# Patient Record
Sex: MALE | URBAN - METROPOLITAN AREA
[De-identification: names, ages, dates, MRNs, and addresses within clinical notes are randomized per-mention and may not be internally consistent; named-entity substitution may affect disease eponyms.]

---

## 2018-03-13 LAB
CREATININE, EXTERNAL: 0.78
LDL-C, EXTERNAL: 132

## 2018-03-17 LAB — HBA1C MFR BLD HPLC: 5.2 %

## 2018-07-23 ENCOUNTER — OFFICE VISIT (OUTPATIENT)
Dept: ENDOCRINOLOGY | Age: 43
End: 2018-07-23

## 2018-07-23 VITALS
RESPIRATION RATE: 18 BRPM | HEART RATE: 80 BPM | SYSTOLIC BLOOD PRESSURE: 137 MMHG | OXYGEN SATURATION: 96 % | DIASTOLIC BLOOD PRESSURE: 86 MMHG | BODY MASS INDEX: 39.99 KG/M2 | HEIGHT: 65 IN | WEIGHT: 240 LBS

## 2018-07-23 DIAGNOSIS — Z99.89 OSA ON CPAP: ICD-10-CM

## 2018-07-23 DIAGNOSIS — G47.33 OSA ON CPAP: ICD-10-CM

## 2018-07-23 DIAGNOSIS — Z86.39 HISTORY OF GOITER: ICD-10-CM

## 2018-07-23 DIAGNOSIS — R79.89 LOW TESTOSTERONE: Primary | ICD-10-CM

## 2018-07-23 DIAGNOSIS — I10 HTN (HYPERTENSION), BENIGN: ICD-10-CM

## 2018-07-23 DIAGNOSIS — R53.83 FATIGUE, UNSPECIFIED TYPE: ICD-10-CM

## 2018-07-23 RX ORDER — VERAPAMIL HYDROCHLORIDE 240 MG/1
TABLET, FILM COATED, EXTENDED RELEASE ORAL
COMMUNITY
Start: 2018-05-11

## 2018-07-23 RX ORDER — LORAZEPAM 1 MG/1
TABLET ORAL
COMMUNITY

## 2018-07-23 RX ORDER — GUAIFENESIN 100 MG/5ML
81 LIQUID (ML) ORAL DAILY
COMMUNITY

## 2018-07-23 RX ORDER — LOPERAMIDE HCL 2 MG
2 TABLET ORAL
COMMUNITY

## 2018-07-23 RX ORDER — BUTALBITAL, ACETAMINOPHEN AND CAFFEINE 50; 325; 40 MG/1; MG/1; MG/1
1 TABLET ORAL
COMMUNITY

## 2018-07-23 RX ORDER — CALCIUM POLYCARBOPHIL 625 MG
625 TABLET ORAL 2 TIMES DAILY
COMMUNITY

## 2018-07-23 RX ORDER — LANOLIN ALCOHOL/MO/W.PET/CERES
1000 CREAM (GRAM) TOPICAL DAILY
COMMUNITY
End: 2018-11-20

## 2018-07-23 RX ORDER — CHOLECALCIFEROL TAB 125 MCG (5000 UNIT) 125 MCG
TAB ORAL 2 TIMES DAILY
COMMUNITY

## 2018-07-23 NOTE — PROGRESS NOTES
1. Have you been to the ER, urgent care clinic since your last visit? Hospitalized since your last visit? No    2. Have you seen or consulted any other health care providers outside of the [address] [place] [address] since your last visit? Include any pap smears or colon screening. No     Chief Complaint   Patient presents with    New Patient     Patient moved from [place]- had labs and test done there.  Thyroid Problem       Patient states he has had low testosterone in the past and tried the injections, states it did not help.      Visit Vitals    /86 (BP 1 Location: Left arm, BP Patient Position: Sitting)    Pulse 80    Resp 18    Ht 5' 5\" (1.651 m)    Wt 240 lb (108.9 kg)    SpO2 96%    BMI 39.94 kg/m2     Learning Assessment 7/23/2018   PRIMARY LEARNER Patient   PRIMARY LANGUAGE ENGLISH   LEARNER PREFERENCE PRIMARY DEMONSTRATION   ANSWERED BY patient   RELATIONSHIP SELF

## 2018-07-23 NOTE — PATIENT INSTRUCTIONS
Low testosterone: Will reassess. I suspect low testosterone is due to the weight gain you've had over the last 10-15 years  I think testosterone would normalize with weight loss - 25 lbs or more. IF testosterone is low  1. We can consider reassessing after efforts to lose weight  2. Consider treating low testosterone with injections - subcutaneous and weekly , or with daily gel application    3. Clomiphene - 50 mg every other day. Tablet. Non-FDA approved, but appears effective and safe in smaller studies    Weight:  Resume tracking calories  Continue exercising, but main focus should be on calorie intake  Recommend eating high fiber foods that are minimally processed. Also work on eating lean proteins. Could consider medications in future if dietary efforts are not successful. Belviq may be particularly helpful for emotional eaters  Contrave may be particularly helpful for binge eaters  Saxenda - daily injection (very small needle) is one of the more effective, but expensive, treatment options. This would need to be covered by your insurance  Phentermine + topiramate - taken individually and as generic medications this is very affordable. Anxiety could be side effect though. Branded product is Qsymia.

## 2018-07-23 NOTE — MR AVS SNAPSHOT
[address] 
[phone] Patient: Veronica Bynum MRN: [id number] :1975 Visit Information Date & Time Provider Department Dept. Phone Encounter #  
 2018 10:50 AM Magnus Song, [address] [facility] Diabetes and Endocrinology 21  Follow-up Instructions Return in about 4 months (around 2018). Upcoming Health Maintenance Date Due DTaP/Tdap/Td series (1 - Tdap) 1997 Influenza Age 5 to Adult 2018 Allergies as of 2018  Review Complete On: 2018 By: Pat Party No Known Allergies Current Immunizations  Never Reviewed No immunizations on file. Not reviewed this visit You Were Diagnosed With   
  
 Codes Comments Low testosterone    -  Primary ICD-10-CM: R79.89 ICD-9-CM: 790.99 BMI 39.0-39.9,adult     ICD-10-CM: U13.01 ICD-9-CM: V85.39   
 MARY JANE on CPAP     ICD-10-CM: G47.33, Z99.89 ICD-9-CM: 327.23, V46.8 HTN (hypertension), benign     ICD-10-CM: I10 
ICD-9-CM: 401.1 History of goiter     ICD-10-CM: Z86.39 
ICD-9-CM: V12.29 Fatigue, unspecified type     ICD-10-CM: R53.83 ICD-9-CM: 780.79 Vitals BP Pulse Resp Height(growth percentile) Weight(growth percentile) SpO2  
 137/86 (BP 1 Location: Left arm, BP Patient Position: Sitting) 80 18 5' 5\" (1.651 m) 240 lb (108.9 kg) 96% BMI Smoking Status 39.94 kg/m2 Former Smoker Vitals History BMI and BSA Data Body Mass Index Body Surface Area  
 39.94 kg/m 2 2.23 m 2 Preferred Pharmacy Pharmacy Name Phone [facility], [address] Your Updated Medication List  
  
   
This list is accurate as of 18 12:10 PM.  Always use your most recent med list.  
  
  
  
  
 aspirin 81 mg chewable tablet Take 81 mg by mouth daily. ATIVAN 1 mg tablet Generic drug:  LORazepam  
Take  by mouth every four (4) hours as needed for Anxiety. cholecalciferol (VITAMIN D3) 5,000 unit Tab tablet Commonly known as:  VITAMIN D3 Take  by mouth two (2) times a day. cpap machine kit  
by Does Not Apply route. cyanocobalamin 1,000 mcg tablet Take 1,000 mcg by mouth daily. ESGIC -40 mg per tablet Generic drug:  butalbital-acetaminophen-caffeine Take 1 Tab by mouth every four (4) hours as needed for Pain. FIBERCON 625 mg tablet Generic drug:  calcium polycarbophil Take 625 mg by mouth two (2) times a day. IMODIUM A-D 2 mg tablet Generic drug:  loperamide Take 2 mg by mouth four (4) times daily as needed for Diarrhea.  
  
 verapamil  mg CR tablet Commonly known as:  CALAN-SR  
  
 ZyrTEC 10 mg Cap Generic drug:  Cetirizine Take  by mouth. We Performed the Following FOLLICLE STIMULATING HORMONE [04613 CPT(R)] IGF-1 Z3400857 CPT(R)] LUTEINIZING HORMONE Q3151783 CPT(R)] T4, FREE O9092178 CPT(R)] TESTOSTERONE, FREE & TOTAL [82083 CPT(R)] TSH 3RD GENERATION [29237 CPT(R)] Follow-up Instructions Return in about 4 months (around 11/23/2018). Patient Instructions Low testosterone: Will reassess. I suspect low testosterone is due to the weight gain you've had over the last 10-15 years I think testosterone would normalize with weight loss - 25 lbs or more. IF testosterone is low 1. We can consider reassessing after efforts to lose weight 2. Consider treating low testosterone with injections - subcutaneous and weekly , or with daily gel application 3. Clomiphene - 50 mg every other day. Tablet. Non-FDA approved, but appears effective and safe in smaller studies Weight: 
Resume tracking calories Continue exercising, but main focus should be on calorie intake Recommend eating high fiber foods that are minimally processed.  Also work on eating lean proteins. Could consider medications in future if dietary efforts are not successful. Belviq may be particularly helpful for emotional eaters Contrave may be particularly helpful for binge eaters Saxenda - daily injection (very small needle) is one of the more effective, but expensive, treatment options. This would need to be covered by your insurance Phentermine + topiramate - taken individually and as generic medications this is very affordable. Anxiety could be side effect though. Branded product is Qsymia. Introducing [facility] SERVICES! Lauren Rodriguez introduces [employer] patient portal. Now you can access parts of your medical record, email your doctor's office, and request medication refills online. 1. In your internet browser, go to [email or web address] 2. Click on the First Time User? Click Here link in the Sign In box. You will see the New Member Sign Up page. 3. Enter your [employer] Access Code exactly as it appears below. You will not need to use this code after youve completed the sign-up process. If you do not sign up before the expiration date, you must request a new code. · [employer] Access Code: [email or web address] Expires: 10/21/2018 12:10 PM 
 
4. Enter the last four digits of your Social Security Number (xxxx) and Date of Birth (mm/dd/yyyy) as indicated and click Submit. You will be taken to the next sign-up page. 5. Create a [employer] ID. This will be your [employer] login ID and cannot be changed, so think of one that is secure and easy to remember. 6. Create a [employer] password. You can change your password at any time. 7. Enter your Password Reset Question and Answer. This can be used at a later time if you forget your password. 8. Enter your e-mail address. You will receive e-mail notification when new information is available in [address] 9. Click Sign Up. You can now view and download portions of your medical record. 10. Click the Download Summary menu link to download a portable copy of your medical information. If you have questions, please visit the Frequently Asked Questions section of the [employer] website. Remember, [employer] is NOT to be used for urgent needs. For medical emergencies, dial 911. Now available from your iPhone and Android! Please provide this summary of care documentation to your next provider. If you have any questions after today's visit, please call [phone].

## 2018-07-23 NOTE — PROGRESS NOTES
Chief Complaint   Patient presents with    New Patient     Patient moved from [place]- had labs and test done there.  Thyroid Problem     History of Present Illness: Eva Mazariegos is a 44 y.o. male presents for evaluation of low testosterone and fatigue  He has HTN and history of migraines. Takes Verapmil for these conditions. Low testosterone was dx on evaluation of fatigue    Describes having long-standing fatigue, which may have started in late 25s or around age 28. Saw endocrinologist in [facility] prior to move. 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 Prolactin was normal as was dexamethasone suppression test.    He feels his libido is good. No erectile problems    BMI 39.9. Exercise-  Walking daily. 3.5 miles. Walks for about 1 hour. Baseline weight around 165-175 lbs. Now weighs 240 lbs. Diet:  - Breakfast: overnight oats or cereal or toast.   - Lunch:  Leftovers or peanut butter and jelly. - Dinner: ordered out - grilled chicken sandwich and fries.    - Beverages: mostly water.    - Snacks: ' I am a snacker'   Feels he does stress eat. Social:      Past Medical History:   Diagnosis Date    History of migraine headaches     HTN (hypertension), benign      Past Surgical History:   Procedure Laterality Date    HX OTHER SURGICAL  2011    Nasal passage surgery    HX SEPTOPLASTY  2006    2 different times     Current Outpatient Prescriptions   Medication Sig    verapamil ER (CALAN-SR) 240 mg CR tablet     cyanocobalamin 1,000 mcg tablet Take 1,000 mcg by mouth daily.  cholecalciferol, VITAMIN D3, (VITAMIN D3) 5,000 unit tab tablet Take  by mouth two (2) times a day.  loperamide (IMODIUM A-D) 2 mg tablet Take 2 mg by mouth four (4) times daily as needed for Diarrhea.  calcium polycarbophil (FIBERCON) 625 mg tablet Take 625 mg by mouth two (2) times a day.  Cetirizine (ZYRTEC) 10 mg cap Take  by mouth.     aspirin 81 mg chewable tablet Take 81 mg by mouth daily.  LORazepam (ATIVAN) 1 mg tablet Take  by mouth every four (4) hours as needed for Anxiety.  butalbital-acetaminophen-caffeine (ESGIC) -40 mg per tablet Take 1 Tab by mouth every four (4) hours as needed for Pain.  cpap machine kit by Does Not Apply route. No current facility-administered medications for this visit. No Known Allergies  Family History   Problem Relation Age of Onset    Diabetes Maternal Grandmother     Diabetes Father      Social History     Social History    Marital status:      Spouse name: N/A    Number of children: N/A    Years of education: N/A     Occupational History    Not on file. Social History Main Topics    Smoking status: Former Smoker    Smokeless tobacco: Never Used    Alcohol use Yes      Comment: occasionally    Drug use: No    Sexual activity: Yes     Other Topics Concern    Not on file     Social History Narrative    No narrative on file       Review of Systems:  - Constitutional Symptoms: no fevers, chills, see HPI  - Eyes: no blurry vision or double vision  - Cardiovascular: no chest pain or palpitations  - Respiratory: no cough or shortness of breath  - Musculoskeletal: no joint pains or weakness  - Integumentary: no rashes  - Neurological: no numbness, tingling, or headaches  - Psychiatric: +  depression and anxiety  Endocrine: no heat or cold intolerance, no polyuria or polydipsia. No changes in his ring size or shoe size. No significant problems with arthritis or elevated glucoses or hyperhidrosis, which would suggest thyromegaly.   Physical Examination:  Visit Vitals    /86 (BP 1 Location: Left arm, BP Patient Position: Sitting)    Pulse 80    Resp 18    Ht 5' 5\" (1.651 m)    Wt 240 lb (108.9 kg)    SpO2 96%    BMI 39.94 kg/m2   -   - General: pleasant, no distress,   - HEENT:No scleral/conjunctival injection, EOMI,  MMM  - Neck: supple, no thyromegaly, masses, lymph nodes, no supraclavicular or dorsocervical fat pads  - Cardiovascular: regular, normal rate  - Respiratory: normal effort  - Integumentary:  no edema  - Neurological: alert  - Psychiatric: normal mood and affect    Data Reviewed:   Labs from 2/2015  Prolactin 14  LH 1.5, FSH 2.8  Cortisol 0.9  Testosterone 230, free testosterone 7.2  Hemoglobin A1c 5.4     Labs from 6/2015  Testosterone 444, free testosterone 6.0    Labs from 9/2016   testosterone 246, free testosterone 7.0    Labs from 5/2019  Testosterone 276, free testosterone 10.1  TSH 3.34, free T4 1.24  Thyroid peroxidase 12  Thyroid-stimulating immunoglobulin 49% (0-139)  Cortisol 18.1, a.m. cortisol after dexamethasone 0.9    Assessment/Plan:   1. Low testosterone   He has hypogonadotropic hypogonadism. Pituitary evaluation revealed no other abnormalities. Explained I suspect the low testosterone is due to obesity and perhaps sleep apnea. Reviewed that I suspect the hypogonadism will improve with weight loss. He tried testosterone therapy in the past with every 2 week intramuscular injections. He did not feel any improvement in his symptoms with this. In my mind, this suggests that perhaps fatigue is not related to low testosterone. Provided the following options:  1. After checking baseline labs, work on weight loss and reassess  2. Retry testosterone therapy as testosterone gel or as injection. Reviewed that if the injections were resumed, would use subcutaneous injection at a once weekly interval.  Reviewed that subcutaneous injections reviewed were shown to be equally as effective intramuscular injections while being referred by the patients. Discussed how once weekly testosterone injections result in less variability and levels. 3.  Clomiphene therapy. This is not FDA approved, but small polyp studies have shown this to be effective and similar in efficacy to AndroGel. No concerns were raised in a three-year trial   2.  BMI 39.0-39.9,adult   He will work on monitoring his food intake using the [employer] pal bob. He will start with 1700 kellen per day. Encouraged continued efforts with exercise, but feel the largest improvements need be made with food intake. Comorbidities include low testosterone, sleep apnea, hypertension  It appears less medications are not covered for him  Reviewed various weight loss medication options available. Phentermine and topiramate may be the only affordable option. Hesitant to use this combination 90.   3. MARY JANE on CPAP -continue using   4. HTN (hypertension), benign -continue verapamil   5. History of goiter -no thyroid enlargement appreciated clinically. Will reassess thyroid function   6. Fatigue, unspecified type   May be due to obesity. Weight loss will be helpful. Greater than 50% of 60 minute visit was spent counseling the patient about above. Patient Instructions   Low testosterone: Will reassess. I suspect low testosterone is due to the weight gain you've had over the last 10-15 years  I think testosterone would normalize with weight loss - 25 lbs or more. IF testosterone is low  1. We can consider reassessing after efforts to lose weight  2. Consider treating low testosterone with injections - subcutaneous and weekly , or with daily gel application    3. Clomiphene - 50 mg every other day. Tablet. Non-FDA approved, but appears effective and safe in smaller studies    Weight:  Resume tracking calories  Continue exercising, but main focus should be on calorie intake  Recommend eating high fiber foods that are minimally processed. Also work on eating lean proteins. Could consider medications in future if dietary efforts are not successful. Belviq may be particularly helpful for emotional eaters  Contrave may be particularly helpful for binge eaters  Saxenda - daily injection (very small needle) is one of the more effective, but expensive, treatment options.  This would need to be covered by your insurance  Phentermine + topiramate - taken individually and as generic medications this is very affordable. Anxiety could be side effect though. Branded product is Qsymia. Follow-up Disposition:  Return in about 4 months (around 11/23/2018).     Copy sent to:

## 2018-07-24 LAB
T4 FREE SERPL-MCNC: 1.26 NG/DL (ref 0.82–1.77)
TSH SERPL DL<=0.005 MIU/L-ACNC: 1.65 UIU/ML (ref 0.45–4.5)

## 2018-07-28 LAB
FSH SERPL-ACNC: 3.4 MIU/ML (ref 1.5–12.4)
IGF-I SERPL-MCNC: 124 NG/ML
LH SERPL-ACNC: 2.6 MIU/ML (ref 1.7–8.6)
TESTOST FREE SERPL-MCNC: 7.9 PG/ML (ref 6.8–21.5)
TESTOST SERPL-MCNC: 297 NG/DL (ref 264–916)

## 2018-07-29 NOTE — PROGRESS NOTES
Will mail lab letter. Labs are normal  Testosterone levels are low normal but I suspect they will increase with weight loss efforts. Dennis Kirk mail lab letter. You can call him and summarize labs if he wishes.

## 2018-07-30 NOTE — PROGRESS NOTES
Spoke with pt regarding his lab results after verifying his name and  and he wanted to make you aware that I called him while he was outside walking!!

## 2018-11-20 ENCOUNTER — OFFICE VISIT (OUTPATIENT)
Dept: ENDOCRINOLOGY | Age: 43
End: 2018-11-20

## 2018-11-20 VITALS
BODY MASS INDEX: 38.12 KG/M2 | WEIGHT: 228.8 LBS | SYSTOLIC BLOOD PRESSURE: 130 MMHG | DIASTOLIC BLOOD PRESSURE: 77 MMHG | HEART RATE: 65 BPM | HEIGHT: 65 IN

## 2018-11-20 DIAGNOSIS — Z99.89 OSA ON CPAP: ICD-10-CM

## 2018-11-20 DIAGNOSIS — G47.33 OSA ON CPAP: ICD-10-CM

## 2018-11-20 DIAGNOSIS — R79.89 LOW TESTOSTERONE: Primary | ICD-10-CM

## 2018-11-20 RX ORDER — RIFAXIMIN 550 MG/1
TABLET ORAL
COMMUNITY
Start: 2018-09-25 | End: 2019-05-23

## 2018-11-20 NOTE — PROGRESS NOTES
History of Present Illness: Noy Craft is a 44 y.o. male presents for follow-up of low testosterone and weight. He has HTN and history of migraines. Takes Verapmil for these conditions. Low testosterone was dx on evaluation of fatigue    Describes having long-standing fatigue, which may have started in late 25s or around age 28. Saw endocrinologist in [facility] prior to move. 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 Prolactin was normal, as was dexamethasone suppression test.    He continues to feel tired. This is the long-standing problem for him. BMI 38. Weight down 12 lbs since last visit. Was down a little further a few months ago, but then increased with changes to life circumstances. Exercise-  Was walking a lot more. Decreased in last few months    Diet:  Biggest change was to track calorie intake. Was 1620 calories. Sleep: has MARY JANE. Uses CPAP. Gets between 6- 8 hours per night. Went to bed 12:30-1 am last night and got up at 7:30 AM.     Social:    Wife has had evaluations of liver lesions. Had in-laws visiting. Wife is on disability. Has a full time job and is studying for [employer]. Past Medical History:   Diagnosis Date    History of migraine headaches     HTN (hypertension), benign      Current Outpatient Medications   Medication Sig    XIFAXAN 550 mg tablet     verapamil ER (CALAN-SR) 240 mg CR tablet     cholecalciferol, VITAMIN D3, (VITAMIN D3) 5,000 unit tab tablet Take  by mouth two (2) times a day.  loperamide (IMODIUM A-D) 2 mg tablet Take 2 mg by mouth four (4) times daily as needed for Diarrhea.  calcium polycarbophil (FIBERCON) 625 mg tablet Take 625 mg by mouth two (2) times a day.  Cetirizine (ZYRTEC) 10 mg cap Take  by mouth.  aspirin 81 mg chewable tablet Take 81 mg by mouth daily.  LORazepam (ATIVAN) 1 mg tablet Take  by mouth every four (4) hours as needed for Anxiety.     butalbital-acetaminophen-caffeine (ESGIC) -40 mg per tablet Take 1 Tab by mouth every four (4) hours as needed for Pain.  cpap machine kit by Does Not Apply route.  cyanocobalamin 1,000 mcg tablet Take 1,000 mcg by mouth daily. No current facility-administered medications for this visit. No Known Allergies    Review of Systems:  - Eyes: no blurry vision or double vision  - Cardiovascular: no chest pain  - Respiratory: no shortness of breath  - Musculoskeletal: no myalgias    Physical Examination:  Visit Vitals  /77   Pulse 65   Ht 5' 5\" (1.651 m)   Wt 228 lb 12.8 oz (103.8 kg)   BMI 38.07 kg/m²   -   - General: pleasant, no distress, normal gait   HEENT: hearing intact, EOMI, clear sclera without icterus  - Cardiovascular: regular, normal rate   - Respiratory: normal effort  - Integumentary: no edema  - Psychiatric: normal mood and affect    Data Reviewed:   Component      Latest Ref Rng & Units 7/23/2018 7/23/2018 7/23/2018 7/23/2018          12:38 PM 12:38 PM 12:37 PM 12:37 PM   Testosterone      264 - 916 ng/dL    297   Free testosterone (Direct)      6.8 - 21.5 pg/mL    7.9   FSH      1.5 - 12.4 mIU/mL       Luteinizing hormone      1.7 - 8.6 mIU/mL       IGF-1 (BL)      ng/mL   124    T4, Free      0.82 - 1.77 ng/dL  1.26     TSH      0.450 - 4.500 uIU/mL 1.650        Component      Latest Ref Rng & Units 7/23/2018 7/23/2018          12:37 PM 12:37 PM   Testosterone      264 - 916 ng/dL     Free testosterone (Direct)      6.8 - 21.5 pg/mL     FSH      1.5 - 12.4 mIU/mL  3.4   Luteinizing hormone      1.7 - 8.6 mIU/mL 2.6      Assessment/Plan:   1. Low testosterone   Levels likely modestly improved from baseline levels. He will continue efforts with exercise, diet and weight loss. Also encouraged him to assure good sleep  Reassess in 4-6 months after continued weight loss efforts. 2. MARY JANE on CPAP   Encouraged continue use of CPAP machine.   Recommended he try to go to bed earlier to have a more normal sleep/wake cycle   3. BMI 38.0-38.9,adult   Continue dietary efforts and exercise. He is aware of this impact on MARY JANE and testosterone. Patient Instructions   Low testosterone:    I think testosterone would normalize with weight loss - < 215 lbs or lower. Reassess again in 4-6 months    Weight:  Continue efforts with diet and exercise. Energy:  Weight loss  Aim for 7-8 hours of sleep per night. Try to go to bed earlier, ideally before midnight or even before 11 pm          Follow-up Disposition:  Return in about 4 months (around 3/20/2019).     Copy sent to:

## 2018-11-20 NOTE — PATIENT INSTRUCTIONS
Low testosterone:    I think testosterone would normalize with weight loss - < 215 lbs or lower. Reassess again in 4-6 months    Weight:  Continue efforts with diet and exercise. Energy:  Weight loss  Aim for 7-8 hours of sleep per night.   Try to go to bed earlier, ideally before midnight or even before 11 pm

## 2019-05-23 ENCOUNTER — OFFICE VISIT (OUTPATIENT)
Dept: ENDOCRINOLOGY | Age: 44
End: 2019-05-23

## 2019-05-23 VITALS
HEIGHT: 65 IN | WEIGHT: 230 LBS | BODY MASS INDEX: 38.32 KG/M2 | SYSTOLIC BLOOD PRESSURE: 137 MMHG | HEART RATE: 81 BPM | DIASTOLIC BLOOD PRESSURE: 78 MMHG

## 2019-05-23 DIAGNOSIS — R79.89 LOW TESTOSTERONE: Primary | ICD-10-CM

## 2019-05-23 DIAGNOSIS — R53.83 FATIGUE, UNSPECIFIED TYPE: ICD-10-CM

## 2019-05-23 RX ORDER — HYDROCHLOROTHIAZIDE 12.5 MG/1
TABLET ORAL
COMMUNITY
Start: 2019-03-22

## 2019-05-23 RX ORDER — VALSARTAN 80 MG/1
TABLET ORAL DAILY
COMMUNITY

## 2019-05-23 NOTE — PROGRESS NOTES
History of Present Illness: Tito Macario is a 38 y.o. male presents for follow-up of low testosterone. He has HTN and history of migraines. Takes Verapmil for these conditions. Low testosterone was dx on evaluation of fatigue    Describes having long-standing fatigue, which may have started in late 25s or around age 28. Saw endocrinologist in [facility] prior to move. 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 Prolactin was normal, as was dexamethasone suppression test.    He continues to feel tired. This is the long-standing problem for him. BMI 38 - stable  Exercise- inconsistent due to work and school obligations. Diet:  Planning to eat a more plant based, whole food diet. Was mainly tracking calories previously. Sleep: has MARY JANE. Uses CPAP. Social:    Father in law had bypass surgery in last month. Has been busy with this. Wife is on disability. Has a full time job and is studying for [employer] - he is in his last year. Past Medical History:   Diagnosis Date    History of migraine headaches     HTN (hypertension), benign      Current Outpatient Medications   Medication Sig    hydroCHLOROthiazide (HYDRODIURIL) 12.5 mg tablet     valsartan (DIOVAN) 80 mg tablet Take  by mouth daily.  verapamil ER (CALAN-SR) 240 mg CR tablet     cholecalciferol, VITAMIN D3, (VITAMIN D3) 5,000 unit tab tablet Take  by mouth two (2) times a day.  loperamide (IMODIUM A-D) 2 mg tablet Take 2 mg by mouth four (4) times daily as needed for Diarrhea.  calcium polycarbophil (FIBERCON) 625 mg tablet Take 625 mg by mouth two (2) times a day.  Cetirizine (ZYRTEC) 10 mg cap Take  by mouth.  aspirin 81 mg chewable tablet Take 81 mg by mouth daily.  LORazepam (ATIVAN) 1 mg tablet Take  by mouth every four (4) hours as needed for Anxiety.     butalbital-acetaminophen-caffeine (ESGIC) -40 mg per tablet Take 1 Tab by mouth every four (4) hours as needed for Pain.    cpap machine kit by Does Not Apply route.  XIFAXAN 550 mg tablet      No current facility-administered medications for this visit. No Known Allergies    Review of Systems:  - Eyes: no blurry vision or double vision  - Cardiovascular: no chest pain  - Respiratory: no shortness of breath  - Musculoskeletal: no myalgias    Physical Examination:  Visit Vitals  /78 (BP 1 Location: Left arm, BP Patient Position: Sitting)   Pulse 81   Ht 5' 5\" (1.651 m)   Wt 230 lb (104.3 kg)   BMI 38.27 kg/m²   -   - General: pleasant, no distress, normal gait   HEENT: hearing intact, EOMI, clear sclera without icterus  - Cardiovascular: regular, normal rate   - Respiratory: normal effort  - Integumentary: no edema  - Psychiatric: normal mood and affect    Data Reviewed:   Lab Results   Component Value Date/Time    Hemoglobin A1c, External 5.2 03/17/2018      Lab Results   Component Value Date/Time    Creatinine, External 0.78 03/13/2018        Lab Results   Component Value Date/Time    LDL-C, External 132 03/13/2018      Lab Results   Component Value Date/Time    TSH 1.650 07/23/2018 12:38 PM    T4, Free 1.26 07/23/2018 12:38 PM        Assessment/Plan:   1. Low testosterone   - suspect due to obesity  Checking testosterone today. Reviewed that testosterone levels often decrease by 25% by late afternoon. Discussed considering trial of clomiphene vs SC testosterone. He was not interested in gels due to daily application and transference concerns. Reviewed we could do a 3 month trial to determine if mood, energy and sexual function/interes improves. 2. BMI 38.0-38.9,adult   - encouraged dietary efforts  - exercise will be a challenge until he finishes [employer] degree. 3. Fatigue, unspecified type   - his schedule is very busy. Patient Instructions   Low testosterone:    Reassess. Level may be 25% lower in late afternoon.      Can consider subcutaneous testosterone injections   OR can consider clomiphene    Weight:  Continue efforts with diet and exercise. Energy:  Weight loss  Aim for 7-8 hours of sleep per night.                 Copy sent to:

## 2019-05-23 NOTE — PATIENT INSTRUCTIONS
Low testosterone:    Reassess. Level may be 25% lower in late afternoon. Can consider subcutaneous testosterone injections   OR can consider clomiphene    Weight:  Continue efforts with diet and exercise. Energy:  Weight loss  Aim for 7-8 hours of sleep per night.

## 2019-05-24 LAB — SPECIMEN STATUS REPORT, ROLRST: NORMAL

## 2019-05-25 LAB
LH SERPL-ACNC: 5.1 MIU/ML (ref 1.7–8.6)
TESTOST FREE SERPL-MCNC: 6.5 PG/ML (ref 6.8–21.5)
TESTOST SERPL-MCNC: 146 NG/DL (ref 264–916)

## 2022-11-17 ENCOUNTER — DOCUMENTATION ONLY (OUTPATIENT)
Dept: SURGERY | Age: 47
End: 2022-11-17

## 2022-12-08 ENCOUNTER — OFFICE VISIT (OUTPATIENT)
Dept: SURGERY | Age: 47
End: 2022-12-08
Payer: COMMERCIAL

## 2022-12-08 VITALS
DIASTOLIC BLOOD PRESSURE: 69 MMHG | HEIGHT: 65 IN | RESPIRATION RATE: 18 BRPM | SYSTOLIC BLOOD PRESSURE: 121 MMHG | WEIGHT: 232.4 LBS | TEMPERATURE: 98.5 F | BODY MASS INDEX: 38.72 KG/M2 | OXYGEN SATURATION: 95 % | HEART RATE: 79 BPM

## 2022-12-08 DIAGNOSIS — K42.9 UMBILICAL HERNIA WITHOUT OBSTRUCTION AND WITHOUT GANGRENE: Primary | ICD-10-CM

## 2022-12-08 RX ORDER — ATORVASTATIN CALCIUM 10 MG/1
10 TABLET, FILM COATED ORAL DAILY
COMMUNITY

## 2022-12-08 NOTE — PROGRESS NOTES
[facility] at [facility] Surgery History and Physical    History of Present Illness:      Fredis Mercer is a 56 y.o. male who has an umbilical hernia. He was at his urologist Dr. Inder Dee recently and was found to have an umbilical hernia and then he was referred to me. The umbilical hernia did generally does not bother him. He has noticed a bulge over the last number of years but never thought anything about it. He does not have any pain or discomfort other than an occasional pain or discomfort generally in his abdomen. He does have a history of Crohn's. In general he does not have much of any pain with straining or lifting. He has no changes in bowel movements currently. Past Medical History:   Diagnosis Date    Calculus of kidney     History of migraine headaches     HTN (hypertension), benign        Past Surgical History:   Procedure Laterality Date    HX OTHER SURGICAL  2011    Nasal passage surgery    HX SEPTOPLASTY  2006    2 different times         Current Outpatient Medications:     atorvastatin (LIPITOR) 10 mg tablet, Take 10 mg by mouth daily. , Disp: , Rfl:     hydroCHLOROthiazide (HYDRODIURIL) 12.5 mg tablet, , Disp: , Rfl:     valsartan (DIOVAN) 80 mg tablet, Take  by mouth daily. , Disp: , Rfl:     verapamil ER (CALAN-SR) 240 mg CR tablet, , Disp: , Rfl:     cholecalciferol, VITAMIN D3, (VITAMIN D3) 5,000 unit tab tablet, Take  by mouth two (2) times a day., Disp: , Rfl:     loperamide (IMMODIUM) 2 mg tablet, Take 2 mg by mouth daily. Taking 1 tab daily, Disp: , Rfl:     calcium polycarbophiL (FIBERCON) 625 mg tablet, Take 625 mg by mouth two (2) times a day., Disp: , Rfl:     aspirin 81 mg chewable tablet, Take 81 mg by mouth daily. , Disp: , Rfl:     cpap machine kit, by Does Not Apply route., Disp: , Rfl:     Cetirizine 10 mg cap, Take  by mouth.  (Patient not taking: Reported on 12/8/2022), Disp: , Rfl:     LORazepam (ATIVAN) 1 mg tablet, Take  by mouth every four (4) hours as needed for Anxiety. (Patient not taking: Reported on 12/8/2022), Disp: , Rfl:     butalbital-acetaminophen-caffeine (FIORICET, ESGIC) -40 mg per tablet, Take 1 Tab by mouth every four (4) hours as needed for Pain.  (Patient not taking: Reported on 12/8/2022), Disp: , Rfl:     No Known Allergies    Social History     Socioeconomic History    Marital status:      Spouse name: Not on file    Number of children: Not on file    Years of education: Not on file    Highest education level: Not on file   Occupational History    Not on file   Tobacco Use    Smoking status: Former    Smokeless tobacco: Never   Substance and Sexual Activity    Alcohol use: Yes     Comment: occasionally    Drug use: No    Sexual activity: Yes   Other Topics Concern    Not on file   Social History Narrative    Not on file     Social Determinants of Health     Financial Resource Strain: Not on file   Food Insecurity: Not on file   Transportation Needs: Not on file   Physical Activity: Not on file   Stress: Not on file   Social Connections: Not on file   Intimate Partner Violence: Not on file   Housing Stability: Not on file       Family History   Problem Relation Age of Onset    Hypertension Mother     Heart Disease Mother     Hypertension Father     Heart Disease Father     Diabetes Father     Diabetes Maternal Grandmother        ROS   Constitutional: negative  Ears, Nose, Mouth, Throat, and Face: negative  Respiratory: negative  Cardiovascular: negative  Gastrointestinal: negative  Genitourinary:negative  Integument/Breast: negative  Hematologic/Lymphatic: negative  Behavioral/Psychiatric: negative  Allergic/Immunologic: negative      Physical Exam:     Visit Vitals  /69 (BP 1 Location: Left arm, BP Patient Position: Sitting, BP Cuff Size: Adult)   Pulse 79   Temp 98.5 °F (36.9 °C) (Oral)   Resp 18   Ht 5' 5\" (1.651 m)   Wt 232 lb 6.4 oz (105.4 kg)   SpO2 95%   BMI 38.67 kg/m²       General - alert and oriented, no apparent distress  HEENT - no jaundice, no hearing imparement  Pulm - CTAB, no C/W/R  CV - RRR, no M/R/G  Abd -soft, nondistended, bowel sounds present, some central obesity, umbilical hernia which is soft and reducible defect difficult to feel but likely at least a centimeter to centimeter and a half  Ext - pulses intact in UE and LE bilaterally, no edema  Skin - supple, no rashes  Psychiatric - normal affect, good mood    Labs  No results found for: NA, K, CL, CO2, AGAP, GLU, BUN, CREA, BUCR, GFRAA, GFRNA, CA, TBIL, TBILI, AP, TP, ALB, GLOB, AGRAT, ALT, AST  No results found for: WBC, WBCLT, HGBPOC, HGB, HGBP, HCTPOC, HCT, PHCT, RBCH, PLT, MCV, HGBEXT, HCTEXT, PLTEXT      Imaging  none  I have reviewed and agree with all of the pertinent images    Assessment:     Eron Ku is a 56 y.o. male with umbilical hernia    Recommendations:     1. He does have an umbilical hernia with a defect likely at least a centimeter to centimeter and a half. With his central obesity there is more stress on it and if he were to elect to have surgery I would do a robotic preperitoneal umbilical hernia repair with mesh in the operating room. Right now he wants to decide on having surgery and will give us a call if he would like to move forward. If he does not move forward with surgery he will keep an eye on it and call us back if it becomes bigger or more painful. Johnson Black MD    Greater than half of the time: 30 minutes was used in counciling the patient about diagnosis and treatment plan    Mr. Kusum Valencia has a reminder for a \"due or due soon\" health maintenance. I have asked that he contact his primary care provider for follow-up on this health maintenance.

## 2022-12-08 NOTE — PROGRESS NOTES
Identified pt with two pt identifiers (name and ). Reviewed chart in preparation for visit and have obtained necessary documentation. Nicole Rouse is a 56 y.o. male  Chief Complaint   Patient presents with    New Patient    Umbilical Hernia     Visit Vitals  /69 (BP 1 Location: Left arm, BP Patient Position: Sitting, BP Cuff Size: Adult)   Pulse 79   Temp 98.5 °F (36.9 °C) (Oral)   Resp 18   Ht 5' 5\" (1.651 m)   Wt 232 lb 6.4 oz (105.4 kg)   SpO2 95%   BMI 38.67 kg/m²       1. Have you been to the ER, urgent care clinic since your last visit? Hospitalized since your last visit? No    2. Have you seen or consulted any other health care providers outside of the [address] [place] [address] since your last visit? Include any pap smears or colon screening.  No

## 2022-12-08 NOTE — LETTER
12/8/2022    Patient: Oleksandr Kent   YOB: 1975   Date of Visit: 12/8/2022     [place], [address] [facility] [address]  Via Fax: [phone]    Dear Heriberto Ricketts MD,      Thank you for referring Mr. Oleksandr Kent to [facility] [address] [facility] for evaluation. My notes for this consultation are attached. If you have questions, please do not hesitate to call me. I look forward to following your patient along with you.       Sincerely,    Christian Fernandez MD

## 2023-01-26 ENCOUNTER — TELEPHONE (OUTPATIENT)
Dept: SURGERY | Age: 48
End: 2023-01-26

## 2023-01-26 NOTE — TELEPHONE ENCOUNTER
Anh Jain from [address] called stating that she needs an updated CPT code for the hernia surgery.

## 2023-01-27 ENCOUNTER — HOSPITAL ENCOUNTER (OUTPATIENT)
Age: 48
Setting detail: OUTPATIENT SURGERY
Discharge: HOME | End: 2023-01-27
Attending: SURGERY | Admitting: SURGERY
Payer: COMMERCIAL

## 2023-01-27 ENCOUNTER — ANESTHESIA (OUTPATIENT)
Dept: SURGERY | Age: 48
End: 2023-01-27
Payer: COMMERCIAL

## 2023-01-27 ENCOUNTER — ANESTHESIA EVENT (OUTPATIENT)
Dept: SURGERY | Age: 48
End: 2023-01-27
Payer: COMMERCIAL

## 2023-01-27 VITALS
BODY MASS INDEX: 38.61 KG/M2 | RESPIRATION RATE: 18 BRPM | OXYGEN SATURATION: 99 % | DIASTOLIC BLOOD PRESSURE: 61 MMHG | SYSTOLIC BLOOD PRESSURE: 119 MMHG | WEIGHT: 232 LBS | HEART RATE: 84 BPM | TEMPERATURE: 98 F

## 2023-01-27 DIAGNOSIS — K42.9 UMBILICAL HERNIA WITHOUT OBSTRUCTION AND WITHOUT GANGRENE: Primary | ICD-10-CM

## 2023-01-27 LAB
ANION GAP BLD CALC-SCNC: 8.2 MMOL/L (ref 10–20)
CA-I BLD-MCNC: 1.13 MMOL/L (ref 1.12–1.32)
CHLORIDE BLD-SCNC: 105 MMOL/L (ref 98–107)
CO2 BLD-SCNC: 25.8 MMOL/L (ref 21–32)
CREAT BLD-MCNC: 0.76 MG/DL (ref 0.6–1.3)
GLUCOSE BLD-MCNC: 110 MG/DL (ref 65–100)
POTASSIUM BLD-SCNC: 3.8 MMOL/L (ref 3.5–5.1)
SERVICE CMNT-IMP: ABNORMAL
SODIUM BLD-SCNC: 139 MMOL/L (ref 136–145)

## 2023-01-27 PROCEDURE — 77030020703 HC SEAL CANN DISP INTU -B: Performed by: SURGERY

## 2023-01-27 PROCEDURE — 74011000250 HC RX REV CODE- 250: Performed by: SURGERY

## 2023-01-27 PROCEDURE — 77030008684 HC TU ET CUF COVD -B: Performed by: NURSE ANESTHETIST, CERTIFIED REGISTERED

## 2023-01-27 PROCEDURE — 77030026438 HC STYL ET INTUB CARD -A: Performed by: NURSE ANESTHETIST, CERTIFIED REGISTERED

## 2023-01-27 PROCEDURE — S2900 ROBOTIC SURGICAL SYSTEM: HCPCS | Performed by: SURGERY

## 2023-01-27 PROCEDURE — C1781 MESH (IMPLANTABLE): HCPCS | Performed by: SURGERY

## 2023-01-27 PROCEDURE — 74011250636 HC RX REV CODE- 250/636: Performed by: SURGERY

## 2023-01-27 PROCEDURE — 77030002895 HC DEV VASC CLOSR COVD -B: Performed by: SURGERY

## 2023-01-27 PROCEDURE — 80047 BASIC METABLC PNL IONIZED CA: CPT

## 2023-01-27 PROCEDURE — 51798 US URINE CAPACITY MEASURE: CPT

## 2023-01-27 PROCEDURE — 77030035277 HC OBTRTR BLDELSS DISP INTU -B: Performed by: SURGERY

## 2023-01-27 PROCEDURE — 77030040361 HC SLV COMPR DVT MDII -B: Performed by: SURGERY

## 2023-01-27 PROCEDURE — 49592 RPR AA HRN 1ST < 3 NCR/STRN: CPT | Performed by: PHYSICIAN ASSISTANT

## 2023-01-27 PROCEDURE — 77030010507 HC ADH SKN DERMBND J&J -B: Performed by: SURGERY

## 2023-01-27 PROCEDURE — 77030002933 HC SUT MCRYL J&J -A: Performed by: SURGERY

## 2023-01-27 PROCEDURE — 2709999900 HC NON-CHARGEABLE SUPPLY: Performed by: SURGERY

## 2023-01-27 PROCEDURE — 74011000250 HC RX REV CODE- 250: Performed by: NURSE ANESTHETIST, CERTIFIED REGISTERED

## 2023-01-27 PROCEDURE — 49592 RPR AA HRN 1ST < 3 NCR/STRN: CPT | Performed by: SURGERY

## 2023-01-27 PROCEDURE — 74011250636 HC RX REV CODE- 250/636

## 2023-01-27 PROCEDURE — 76060000034 HC ANESTHESIA 1.5 TO 2 HR: Performed by: SURGERY

## 2023-01-27 PROCEDURE — 74011250637 HC RX REV CODE- 250/637: Performed by: ANESTHESIOLOGY

## 2023-01-27 PROCEDURE — 74011250636 HC RX REV CODE- 250/636: Performed by: ANESTHESIOLOGY

## 2023-01-27 PROCEDURE — 76210000019 HC OR PH I REC 4 TO 4.5 HR: Performed by: SURGERY

## 2023-01-27 PROCEDURE — 76010000875 HC OR TIME 1.5 TO 2HR INTENSV - TIER 2: Performed by: SURGERY

## 2023-01-27 PROCEDURE — 74011250636 HC RX REV CODE- 250/636: Performed by: NURSE ANESTHETIST, CERTIFIED REGISTERED

## 2023-01-27 PROCEDURE — 77030002912 HC SUT ETHBND J&J -A: Performed by: SURGERY

## 2023-01-27 PROCEDURE — 76210000020 HC REC RM PH II FIRST 0.5 HR: Performed by: SURGERY

## 2023-01-27 PROCEDURE — 77030031139 HC SUT VCRL2 J&J -A: Performed by: SURGERY

## 2023-01-27 DEVICE — MESH HERN W6XL6IN INGUINAL POLYPR SQ L PORE MFIL SFT KNIT: Type: IMPLANTABLE DEVICE | Site: UMBILICAL | Status: FUNCTIONAL

## 2023-01-27 RX ORDER — HYDROMORPHONE HYDROCHLORIDE 1 MG/ML
0.2 INJECTION, SOLUTION INTRAMUSCULAR; INTRAVENOUS; SUBCUTANEOUS
Status: DISCONTINUED | OUTPATIENT
Start: 2023-01-27 | End: 2023-01-27 | Stop reason: HOSPADM

## 2023-01-27 RX ORDER — ONDANSETRON 2 MG/ML
4 INJECTION INTRAMUSCULAR; INTRAVENOUS AS NEEDED
Status: DISCONTINUED | OUTPATIENT
Start: 2023-01-27 | End: 2023-01-27 | Stop reason: HOSPADM

## 2023-01-27 RX ORDER — PROCHLORPERAZINE EDISYLATE 5 MG/ML
5 INJECTION INTRAMUSCULAR; INTRAVENOUS ONCE
Status: COMPLETED | OUTPATIENT
Start: 2023-01-27 | End: 2023-01-27

## 2023-01-27 RX ORDER — MIDAZOLAM HYDROCHLORIDE 1 MG/ML
INJECTION, SOLUTION INTRAMUSCULAR; INTRAVENOUS AS NEEDED
Status: DISCONTINUED | OUTPATIENT
Start: 2023-01-27 | End: 2023-01-27 | Stop reason: HOSPADM

## 2023-01-27 RX ORDER — PROPOFOL 10 MG/ML
INJECTION, EMULSION INTRAVENOUS AS NEEDED
Status: DISCONTINUED | OUTPATIENT
Start: 2023-01-27 | End: 2023-01-27 | Stop reason: HOSPADM

## 2023-01-27 RX ORDER — PROCHLORPERAZINE EDISYLATE 5 MG/ML
INJECTION INTRAMUSCULAR; INTRAVENOUS
Status: COMPLETED
Start: 2023-01-27 | End: 2023-01-27

## 2023-01-27 RX ORDER — SODIUM CHLORIDE 0.9 % (FLUSH) 0.9 %
5-40 SYRINGE (ML) INJECTION AS NEEDED
Status: DISCONTINUED | OUTPATIENT
Start: 2023-01-27 | End: 2023-01-27 | Stop reason: HOSPADM

## 2023-01-27 RX ORDER — MIDAZOLAM HYDROCHLORIDE 1 MG/ML
1 INJECTION, SOLUTION INTRAMUSCULAR; INTRAVENOUS AS NEEDED
Status: DISCONTINUED | OUTPATIENT
Start: 2023-01-27 | End: 2023-01-27 | Stop reason: HOSPADM

## 2023-01-27 RX ORDER — HYDROMORPHONE HYDROCHLORIDE 2 MG/ML
INJECTION, SOLUTION INTRAMUSCULAR; INTRAVENOUS; SUBCUTANEOUS AS NEEDED
Status: DISCONTINUED | OUTPATIENT
Start: 2023-01-27 | End: 2023-01-27 | Stop reason: HOSPADM

## 2023-01-27 RX ORDER — FENTANYL CITRATE 50 UG/ML
50 INJECTION, SOLUTION INTRAMUSCULAR; INTRAVENOUS AS NEEDED
Status: DISCONTINUED | OUTPATIENT
Start: 2023-01-27 | End: 2023-01-27 | Stop reason: HOSPADM

## 2023-01-27 RX ORDER — SODIUM CHLORIDE, SODIUM LACTATE, POTASSIUM CHLORIDE, CALCIUM CHLORIDE 600; 310; 30; 20 MG/100ML; MG/100ML; MG/100ML; MG/100ML
INJECTION, SOLUTION INTRAVENOUS
Status: DISCONTINUED | OUTPATIENT
Start: 2023-01-27 | End: 2023-01-27 | Stop reason: HOSPADM

## 2023-01-27 RX ORDER — LIDOCAINE HYDROCHLORIDE 20 MG/ML
INJECTION, SOLUTION EPIDURAL; INFILTRATION; INTRACAUDAL; PERINEURAL AS NEEDED
Status: DISCONTINUED | OUTPATIENT
Start: 2023-01-27 | End: 2023-01-27 | Stop reason: HOSPADM

## 2023-01-27 RX ORDER — OXYCODONE AND ACETAMINOPHEN 5; 325 MG/1; MG/1
1 TABLET ORAL AS NEEDED
Status: DISCONTINUED | OUTPATIENT
Start: 2023-01-27 | End: 2023-01-27 | Stop reason: HOSPADM

## 2023-01-27 RX ORDER — SODIUM CHLORIDE 0.9 % (FLUSH) 0.9 %
5-40 SYRINGE (ML) INJECTION EVERY 8 HOURS
Status: DISCONTINUED | OUTPATIENT
Start: 2023-01-27 | End: 2023-01-27 | Stop reason: HOSPADM

## 2023-01-27 RX ORDER — FENTANYL CITRATE 50 UG/ML
INJECTION, SOLUTION INTRAMUSCULAR; INTRAVENOUS AS NEEDED
Status: DISCONTINUED | OUTPATIENT
Start: 2023-01-27 | End: 2023-01-27 | Stop reason: HOSPADM

## 2023-01-27 RX ORDER — MORPHINE SULFATE 2 MG/ML
2 INJECTION, SOLUTION INTRAMUSCULAR; INTRAVENOUS
Status: DISCONTINUED | OUTPATIENT
Start: 2023-01-27 | End: 2023-01-27 | Stop reason: HOSPADM

## 2023-01-27 RX ORDER — ONDANSETRON 2 MG/ML
INJECTION INTRAMUSCULAR; INTRAVENOUS AS NEEDED
Status: DISCONTINUED | OUTPATIENT
Start: 2023-01-27 | End: 2023-01-27 | Stop reason: HOSPADM

## 2023-01-27 RX ORDER — ACETAMINOPHEN 325 MG/1
650 TABLET ORAL ONCE
Status: COMPLETED | OUTPATIENT
Start: 2023-01-27 | End: 2023-01-27

## 2023-01-27 RX ORDER — ROCURONIUM BROMIDE 10 MG/ML
INJECTION, SOLUTION INTRAVENOUS AS NEEDED
Status: DISCONTINUED | OUTPATIENT
Start: 2023-01-27 | End: 2023-01-27 | Stop reason: HOSPADM

## 2023-01-27 RX ORDER — SODIUM CHLORIDE 9 MG/ML
25 INJECTION, SOLUTION INTRAVENOUS CONTINUOUS
Status: DISCONTINUED | OUTPATIENT
Start: 2023-01-27 | End: 2023-01-27 | Stop reason: HOSPADM

## 2023-01-27 RX ORDER — DEXAMETHASONE SODIUM PHOSPHATE 4 MG/ML
INJECTION, SOLUTION INTRA-ARTICULAR; INTRALESIONAL; INTRAMUSCULAR; INTRAVENOUS; SOFT TISSUE AS NEEDED
Status: DISCONTINUED | OUTPATIENT
Start: 2023-01-27 | End: 2023-01-27 | Stop reason: HOSPADM

## 2023-01-27 RX ORDER — LIDOCAINE HYDROCHLORIDE 10 MG/ML
0.1 INJECTION, SOLUTION EPIDURAL; INFILTRATION; INTRACAUDAL; PERINEURAL AS NEEDED
Status: DISCONTINUED | OUTPATIENT
Start: 2023-01-27 | End: 2023-01-27 | Stop reason: HOSPADM

## 2023-01-27 RX ORDER — BUPIVACAINE HYDROCHLORIDE AND EPINEPHRINE 5; 5 MG/ML; UG/ML
INJECTION, SOLUTION EPIDURAL; INTRACAUDAL; PERINEURAL AS NEEDED
Status: DISCONTINUED | OUTPATIENT
Start: 2023-01-27 | End: 2023-01-27 | Stop reason: HOSPADM

## 2023-01-27 RX ORDER — SODIUM CHLORIDE, SODIUM LACTATE, POTASSIUM CHLORIDE, CALCIUM CHLORIDE 600; 310; 30; 20 MG/100ML; MG/100ML; MG/100ML; MG/100ML
125 INJECTION, SOLUTION INTRAVENOUS CONTINUOUS
Status: DISCONTINUED | OUTPATIENT
Start: 2023-01-27 | End: 2023-01-27 | Stop reason: HOSPADM

## 2023-01-27 RX ORDER — DIPHENHYDRAMINE HYDROCHLORIDE 50 MG/ML
12.5 INJECTION, SOLUTION INTRAMUSCULAR; INTRAVENOUS AS NEEDED
Status: DISCONTINUED | OUTPATIENT
Start: 2023-01-27 | End: 2023-01-27 | Stop reason: HOSPADM

## 2023-01-27 RX ORDER — SUCCINYLCHOLINE CHLORIDE 20 MG/ML
INJECTION INTRAMUSCULAR; INTRAVENOUS AS NEEDED
Status: DISCONTINUED | OUTPATIENT
Start: 2023-01-27 | End: 2023-01-27 | Stop reason: HOSPADM

## 2023-01-27 RX ORDER — MIDAZOLAM HYDROCHLORIDE 1 MG/ML
0.5 INJECTION, SOLUTION INTRAMUSCULAR; INTRAVENOUS
Status: DISCONTINUED | OUTPATIENT
Start: 2023-01-27 | End: 2023-01-27 | Stop reason: HOSPADM

## 2023-01-27 RX ORDER — PROCHLORPERAZINE EDISYLATE 5 MG/ML
5 INJECTION INTRAMUSCULAR; INTRAVENOUS
Status: COMPLETED | OUTPATIENT
Start: 2023-01-27 | End: 2023-01-27

## 2023-01-27 RX ORDER — GLYCOPYRROLATE 0.2 MG/ML
INJECTION INTRAMUSCULAR; INTRAVENOUS AS NEEDED
Status: DISCONTINUED | OUTPATIENT
Start: 2023-01-27 | End: 2023-01-27 | Stop reason: HOSPADM

## 2023-01-27 RX ORDER — OXYCODONE AND ACETAMINOPHEN 5; 325 MG/1; MG/1
1 TABLET ORAL
Qty: 20 TABLET | Refills: 0 | Status: SHIPPED | OUTPATIENT
Start: 2023-01-27 | End: 2023-02-03

## 2023-01-27 RX ORDER — NEOSTIGMINE METHYLSULFATE 1 MG/ML
INJECTION, SOLUTION INTRAVENOUS AS NEEDED
Status: DISCONTINUED | OUTPATIENT
Start: 2023-01-27 | End: 2023-01-27 | Stop reason: HOSPADM

## 2023-01-27 RX ORDER — IBUPROFEN 800 MG/1
800 TABLET ORAL
Qty: 20 TABLET | Refills: 0 | Status: SHIPPED | OUTPATIENT
Start: 2023-01-27

## 2023-01-27 RX ORDER — FENTANYL CITRATE 50 UG/ML
25 INJECTION, SOLUTION INTRAMUSCULAR; INTRAVENOUS
Status: DISCONTINUED | OUTPATIENT
Start: 2023-01-27 | End: 2023-01-27 | Stop reason: HOSPADM

## 2023-01-27 RX ADMIN — SODIUM CHLORIDE, POTASSIUM CHLORIDE, SODIUM LACTATE AND CALCIUM CHLORIDE: 600; 310; 30; 20 INJECTION, SOLUTION INTRAVENOUS at 09:40

## 2023-01-27 RX ADMIN — ROCURONIUM BROMIDE 5 MG: 10 SOLUTION INTRAVENOUS at 09:50

## 2023-01-27 RX ADMIN — ONDANSETRON HYDROCHLORIDE 4 MG: 2 INJECTION, SOLUTION INTRAMUSCULAR; INTRAVENOUS at 09:55

## 2023-01-27 RX ADMIN — WATER 2 G: 1 INJECTION INTRAMUSCULAR; INTRAVENOUS; SUBCUTANEOUS at 10:01

## 2023-01-27 RX ADMIN — HYDROMORPHONE HYDROCHLORIDE 0.5 MG: 2 INJECTION, SOLUTION INTRAMUSCULAR; INTRAVENOUS; SUBCUTANEOUS at 11:11

## 2023-01-27 RX ADMIN — HYDROMORPHONE HYDROCHLORIDE 0.5 MG: 2 INJECTION, SOLUTION INTRAMUSCULAR; INTRAVENOUS; SUBCUTANEOUS at 09:48

## 2023-01-27 RX ADMIN — DEXAMETHASONE SODIUM PHOSPHATE 4 MG: 4 INJECTION, SOLUTION INTRAMUSCULAR; INTRAVENOUS at 09:55

## 2023-01-27 RX ADMIN — PROCHLORPERAZINE EDISYLATE 5 MG: 5 INJECTION INTRAMUSCULAR; INTRAVENOUS at 14:22

## 2023-01-27 RX ADMIN — SODIUM CHLORIDE, POTASSIUM CHLORIDE, SODIUM LACTATE AND CALCIUM CHLORIDE 125 ML/HR: 600; 310; 30; 20 INJECTION, SOLUTION INTRAVENOUS at 09:22

## 2023-01-27 RX ADMIN — LIDOCAINE HYDROCHLORIDE 100 MG: 20 INJECTION, SOLUTION EPIDURAL; INFILTRATION; INTRACAUDAL; PERINEURAL at 09:50

## 2023-01-27 RX ADMIN — PROPOFOL 50 MG: 10 INJECTION, EMULSION INTRAVENOUS at 09:51

## 2023-01-27 RX ADMIN — Medication 3 MG: at 11:03

## 2023-01-27 RX ADMIN — HYDROMORPHONE HYDROCHLORIDE 0.5 MG: 2 INJECTION, SOLUTION INTRAMUSCULAR; INTRAVENOUS; SUBCUTANEOUS at 10:01

## 2023-01-27 RX ADMIN — OXYCODONE AND ACETAMINOPHEN 1 TABLET: 5; 325 TABLET ORAL at 14:26

## 2023-01-27 RX ADMIN — SODIUM CHLORIDE, POTASSIUM CHLORIDE, SODIUM LACTATE AND CALCIUM CHLORIDE: 600; 310; 30; 20 INJECTION, SOLUTION INTRAVENOUS at 11:03

## 2023-01-27 RX ADMIN — PHENYLEPHRINE HYDROCHLORIDE 120 MCG: 10 INJECTION INTRAVENOUS at 09:56

## 2023-01-27 RX ADMIN — MIDAZOLAM 2 MG: 1 INJECTION INTRAMUSCULAR; INTRAVENOUS at 09:41

## 2023-01-27 RX ADMIN — PROPOFOL 200 MG: 10 INJECTION, EMULSION INTRAVENOUS at 09:50

## 2023-01-27 RX ADMIN — SUCCINYLCHOLINE CHLORIDE 160 MG: 20 INJECTION, SOLUTION INTRAMUSCULAR; INTRAVENOUS at 09:51

## 2023-01-27 RX ADMIN — ACETAMINOPHEN 650 MG: 325 TABLET ORAL at 09:29

## 2023-01-27 RX ADMIN — PROCHLORPERAZINE EDISYLATE 5 MG: 5 INJECTION INTRAMUSCULAR; INTRAVENOUS at 13:11

## 2023-01-27 RX ADMIN — GLYCOPYRROLATE 0.6 MG: 0.2 INJECTION INTRAMUSCULAR; INTRAVENOUS at 11:03

## 2023-01-27 RX ADMIN — FENTANYL CITRATE 100 MCG: 50 INJECTION, SOLUTION INTRAMUSCULAR; INTRAVENOUS at 09:50

## 2023-01-27 RX ADMIN — ONDANSETRON HYDROCHLORIDE 4 MG: 2 SOLUTION INTRAMUSCULAR; INTRAVENOUS at 12:35

## 2023-01-27 NOTE — DISCHARGE INSTRUCTIONS
Umbilical Hernia Repair    Patient Discharge Instructions    Griffin Mitchell / [id number] : 1975    Admitted 2023 Discharged: 2023       It is important that you take the medication exactly as they are prescribed. Keep your medication in the bottles provided by the pharmacist and keep a list of the medication names, dosages, and times to be taken in your wallet. Do not take other medications without consulting your doctor. Wound care: May shower at home starting tomorrow, do not remove the dermabond covering the wound. It will fall off on its own in a few weeks. No heavy lifting or strenuous activity for 2 wks      What to do at Home    See detailed instructions below. Follow-up with Dr. Hermelinda Padilla in 2 week(s). Call the office ([phone]) to schedule your appointment. Information obtained by :  I understand that if any problems occur once I am at home I am to contact my physician. I understand and acknowledge receipt of the instructions indicated above. [de-identified] or R.N.'s Signature                                                                  Date/Time                                                                                                                                              Patient or Representative Signature                                                          Date/Time     Umbilical Hernia Repair: What to Expect at Home  Your Recovery  After surgery to repair your hernia, you are likely to have pain for a few days. You may also feel like you have the flu, and you may have a low fever and feel tired and nauseated. This is common. You should feel better after a few days and will probably feel much better in 7 days. For several weeks you may feel twinges or pulling in the hernia repair when you move.  You may have some bruising around the area of your hernia repair. This is normal.  This care sheet gives you a general idea about how long it will take for you to recover. But each person recovers at a different pace. Follow the steps below to get better as quickly as possible. How can you care for yourself at home? Activity  Rest when you feel tired. Getting enough sleep will help you recover. Sleep with your head up by using three or four pillows. You can also try to sleep with your head up in a recliner chair. Do not sleep on your side or stomach. Try to walk each day. Start by walking a little more than you did the day before. Bit by bit, increase the amount you walk. Walking boosts blood flow and helps prevent pneumonia and constipation. Put ice or a cold pack on the area of your hernia repair for 10 to 20 minutes at a time. Try to do this every 1 to 2 hours for the first 24 hours (when you are awake) or until the swelling goes down. Put a thin cloth between the ice and your skin. If your doctor gives you an abdominal binder to wear, use it as directed. This is an elastic bandage that wraps around your belly and upper hips. It helps support your belly muscles after surgery. Avoid strenuous activities, such as biking, jogging, weight lifting, or aerobic exercise, until your doctor says it is okay. Avoid lifting anything that would make you strain. This may include heavy grocery bags and milk containers, a heavy briefcase or backpack, cat litter or dog food bags, a vacuum , or a child. Ask your doctor when you can drive again. Most people are able to return to work within 1 to 2 weeks after surgery. But if your job requires that you to do heavy lifting or strenuous activity, you may need to take 4 to 6 weeks off from work. You may shower 24 to 48 hours after surgery, if your doctor okays it. Pat the cut (incision) dry.  Do not take a bath for the first 2 weeks, or until your doctor tells you it is okay.  Ask your doctor when it is okay for you to have sex. Diet  You can eat your normal diet. If your stomach is upset, try bland, low-fat foods like plain rice, broiled chicken, toast, and yogurt. Drink plenty of fluids (unless your doctor tells you not to). You may notice that your bowel movements are not regular right after your surgery. This is common. Avoid constipation and straining with bowel movements. You may want to take a fiber supplement every day. If you have not had a bowel movement after a couple of days, ask your doctor about taking a mild laxative. Medicines  Take pain medicines exactly as directed. If the doctor gave you a prescription medicine for pain, take it as prescribed. If you are not taking a prescription pain medicine, ask your doctor if you can take an over-the-counter medicine. Do not take two or more pain medicines at the same time unless the doctor told you to. Many pain medicines have acetaminophen, which is Tylenol. Too much acetaminophen (Tylenol) can be harmful. If your doctor prescribed antibiotics, take them as directed. Do not stop taking them just because you feel better. You need to take the full course of antibiotics. If you think your pain medicine is making you sick to your stomach: Take your medicine after meals (unless your doctor has told you not to). Ask your doctor for a different pain medicine. Incision care  Wash the area daily with warm, soapy water, and pat it dry. Don't use hydrogen peroxide or alcohol, which may delay healing. You may cover the area with a gauze bandage if it weeps or rubs against clothing. Change the bandage every day. Other instructions  Hold a pillow over your incision when you cough or take deep breaths. This will support your belly and decrease your pain. Do breathing exercises at home as instructed by your doctor. This will help prevent pneumonia.   If you had laparoscopic surgery, you may also have pain in your left shoulder. The pain usually lasts about a day or two. Follow-up care is a key part of your treatment and safety. Be sure to make and go to all appointments, and call your doctor if you are having problems. It's also a good idea to know your test results and keep a list of the medicines you take. When should you call for help? Call 911 anytime you think you may need emergency care. For example, call if:  You passed out (lost consciousness). You have sudden chest pain and shortness of breath, or you cough up blood. You have severe pain in your belly. Call your doctor now or seek immediate medical care if:  You are sick to your stomach and cannot keep fluids down. You have signs of a blood clot, such as:  Pain in your calf, back of the knee, thigh, or groin. Redness and swelling in your leg or groin. You have signs of infection, such as: Increased pain, swelling, warmth, or redness. Red streaks leading from the incision. Pus draining from the incision. Swollen lymph nodes in the groin. A fever. You have trouble passing urine or stool, especially if you have mild pain or swelling in your lower belly. Bright red blood has soaked through the bandage over your incision. Watch closely for changes in your health, and be sure to contact your doctor if:  Your swelling is getting worse. Your swelling is not going down. You still don't have a bowel movement after taking a laxative. Where can you learn more? Go to [employer]Digest.be  Enter B577 in the search box to learn more about \"Abdominal Hernia Repair: What to Expect at Home. \"   © 6806-3196 [employer], [employer]. Care instructions adapted under license by [facility] (which disclaims liability or warranty for this information).  This care instruction is for use with your licensed healthcare professional. If you have questions about a medical condition or this instruction, always ask your healthcare professional. Ruddy Parra any warranty or liability for your use of this information. Content Version: 8.8.69208; Last Revised: August 24, 2010     DISCHARGE SUMMARY from Nurse    The following personal items collected during your admission are returned to you:   Dental Appliance: Dental Appliances: None  Vision:    Hearing Aid:    Jewelry: Jewelry: None  Clothing: Clothing: Other (comment) (clothes and shoes to pacu)  Other Valuables: Other Valuables: None  Valuables sent to safe: ALL CLOTHING/VALUABLES RETURNED TO PATIENT BEFORE D/C HOME    PATIENT INSTRUCTIONS:    The first meal should be something that is light; not greasy or spicy. If this is tolerated, then advance to regular diet as tolerated. Anesthesia Discharge Instructions    After general anesthesia or intervenous sedation, for 24 hours or while taking prescription Narcotics:  Limit your activities  Do not drive or operate hazardous machinery  If you have not urinated within 8 hours after discharge, please contact your surgeon on call. Do not make important personal or business decisions  Do not drink alcoholic beverages    Report the following to your surgeon:  Excessive pain, swelling, redness or odor of or around the surgical area  Temperature over 100.5 degrees  Nausea and vomiting lasting longer than 4 hours or if unable to take medication  Any signs of decreased circulation or nerve impairment to extremity:  Change in color, persistent numbness, tingling, coldness or increased pain.   Any questions    Pain  Take pain medication as directed by your doctor   Call your doctor if pain is NOT relieved by medication  DO NOT take aspirin or blood thinners until directed by your doctor

## 2023-01-27 NOTE — H&P
[facility] at [facility] Surgery History and Physical     History of Present Illness:      Nanette Devries is a 56 y.o. male who has an umbilical hernia. He was at his urologist Dr. Mills Man recently and was found to have an umbilical hernia and then he was referred to me. The umbilical hernia did generally does not bother him. He has noticed a bulge over the last number of years but never thought anything about it. He does not have any pain or discomfort other than an occasional pain or discomfort generally in his abdomen. He does have a history of Crohn's. In general he does not have much of any pain with straining or lifting. He has no changes in bowel movements currently. Past Medical History:   Diagnosis Date    Calculus of kidney      History of migraine headaches      HTN (hypertension), benign                 Past Surgical History:   Procedure Laterality Date    HX OTHER SURGICAL   2011     Nasal passage surgery    HX SEPTOPLASTY   2006     2 different times            Current Outpatient Medications:     atorvastatin (LIPITOR) 10 mg tablet, Take 10 mg by mouth daily. , Disp: , Rfl:     hydroCHLOROthiazide (HYDRODIURIL) 12.5 mg tablet, , Disp: , Rfl:     valsartan (DIOVAN) 80 mg tablet, Take  by mouth daily. , Disp: , Rfl:     verapamil ER (CALAN-SR) 240 mg CR tablet, , Disp: , Rfl:     cholecalciferol, VITAMIN D3, (VITAMIN D3) 5,000 unit tab tablet, Take  by mouth two (2) times a day., Disp: , Rfl:     loperamide (IMMODIUM) 2 mg tablet, Take 2 mg by mouth daily. Taking 1 tab daily, Disp: , Rfl:     calcium polycarbophiL (FIBERCON) 625 mg tablet, Take 625 mg by mouth two (2) times a day., Disp: , Rfl:     aspirin 81 mg chewable tablet, Take 81 mg by mouth daily. , Disp: , Rfl:     cpap machine kit, by Does Not Apply route., Disp: , Rfl:     Cetirizine 10 mg cap, Take  by mouth.  (Patient not taking: Reported on 12/8/2022), Disp: , Rfl:     LORazepam (ATIVAN) 1 mg tablet, Take  by mouth every four (4) hours as needed for Anxiety. (Patient not taking: Reported on 12/8/2022), Disp: , Rfl:     butalbital-acetaminophen-caffeine (FIORICET, ESGIC) -40 mg per tablet, Take 1 Tab by mouth every four (4) hours as needed for Pain.  (Patient not taking: Reported on 12/8/2022), Disp: , Rfl:      No Known Allergies     Social History            Socioeconomic History    Marital status:        Spouse name: Not on file    Number of children: Not on file    Years of education: Not on file    Highest education level: Not on file   Occupational History    Not on file   Tobacco Use    Smoking status: Former    Smokeless tobacco: Never   Substance and Sexual Activity    Alcohol use: Yes       Comment: occasionally    Drug use: No    Sexual activity: Yes   Other Topics Concern    Not on file   Social History Narrative    Not on file      Social Determinants of Health      Financial Resource Strain: Not on file   Food Insecurity: Not on file   Transportation Needs: Not on file   Physical Activity: Not on file   Stress: Not on file   Social Connections: Not on file   Intimate Partner Violence: Not on file   Housing Stability: Not on file               Family History   Problem Relation Age of Onset    Hypertension Mother      Heart Disease Mother      Hypertension Father      Heart Disease Father      Diabetes Father      Diabetes Maternal Grandmother           ROS   Constitutional: negative  Ears, Nose, Mouth, Throat, and Face: negative  Respiratory: negative  Cardiovascular: negative  Gastrointestinal: negative  Genitourinary:negative  Integument/Breast: negative  Hematologic/Lymphatic: negative  Behavioral/Psychiatric: negative  Allergic/Immunologic: negative        Physical Exam:      Visit Vitals  /69 (BP 1 Location: Left arm, BP Patient Position: Sitting, BP Cuff Size: Adult)   Pulse 79   Temp 98.5 °F (36.9 °C) (Oral)   Resp 18   Ht 5' 5\" (1.651 m)   Wt 232 lb 6.4 oz (105.4 kg)   SpO2 95%   BMI 38.67 kg/m²         General - alert and oriented, no apparent distress  HEENT - no jaundice, no hearing imparement  Pulm - CTAB, no C/W/R  CV - RRR, no M/R/G  Abd -soft, nondistended, bowel sounds present, some central obesity, umbilical hernia which is soft and reducible defect difficult to feel but likely at least a centimeter to centimeter and a half  Ext - pulses intact in UE and LE bilaterally, no edema  Skin - supple, no rashes  Psychiatric - normal affect, good mood     Labs  No results found for: NA, K, CL, CO2, AGAP, GLU, BUN, CREA, BUCR, GFRAA, GFRNA, CA, TBIL, TBILI, AP, TP, ALB, GLOB, AGRAT, ALT, AST  No results found for: WBC, WBCLT, HGBPOC, HGB, HGBP, HCTPOC, HCT, PHCT, RBCH, PLT, MCV, HGBEXT, HCTEXT, PLTEXT        Imaging  none  I have reviewed and agree with all of the pertinent images     Assessment:      Can Savage is a 56 y.o. male with umbilical hernia     Recommendations:      1. He does have an umbilical hernia with a defect likely at least a centimeter to centimeter and a half. With his central obesity there is more stress on it and if he were to elect to have surgery I would do a robotic preperitoneal umbilical hernia repair with mesh in the operating room. Right now he wants to decide on having surgery and will give us a call if he would like to move forward. If he does not move forward with surgery he will keep an eye on it and call us back if it becomes bigger or more painful.      Debra Mcgovern MD

## 2023-01-27 NOTE — BRIEF OP NOTE
Brief Postoperative Note    Patient: Pauline Lockett  YOB: 1975  MRN: [id number]    Date of Procedure: 1/27/2023     Pre-Op Diagnosis: UMBILICAL HERNIA    Post-Op Diagnosis:  INCARCERATED UMBILICAL HERNIA       Procedure(s):  ROBOTIC INCARCERATED UMBILICAL HERNIA REPAIR WITH MESH    Surgeon(s):  Francie Bhagat MD    Surgical Assistant: Physician Assistant: JUSTINE Rodriguez    Anesthesia: General     Estimated Blood Loss (mL): Minimal    Complications: None    Specimens: * No specimens in log *     Implants:   Implant Name Type Inv.  Item Serial No.  Lot No. LRB No. Used Action   MESH CHIP [id number] INGUINAL POLYPR SQ L PORE MFIL SFT KNIT - SN/A Mesh MESH CHIP [id number] INGUINAL POLYPR SQ L PORE MFIL SFT KNIT N/A BARD DAVOL_WD [id number] N/A 1 Implanted       Drains: * No LDAs found *    Findings: umbilical hernia with 2cm defect, large amount of incarcerated preperitoneal fat in the defect, repaired primarily and with 9x9cm Bard Soft mesh placed preperitoneal    Electronically Signed by Elva Johnston MD on 1/27/2023 at 11:23 AM

## 2023-01-27 NOTE — ROUTINE PROCESS
Patient: J Carlos Conklin MRN: [id number]  SSN: [id number]   YOB: 1975  Age: 53 y.o. Sex: male     Patient is status post Procedure(s):  ROBOTIC UMBILICAL HERNIA REPAIR WITH MESH.     Surgeon(s) and Role:     Armandina Dance, MD - Primary    Local/Dose/Irrigation:  See [facility]                  Peripheral IV 01/27/23 Left Hand (Active)   Site Assessment Clean, dry, & intact 01/27/23 0921   Phlebitis Assessment 0 01/27/23 0921   Infiltration Assessment 0 01/27/23 0921   Dressing Status Clean, dry, & intact 01/27/23 0921   Dressing Type Transparent 01/27/23 0921   Hub Color/Line Status Pink 01/27/23 0921            Airway - Endotracheal Tube 01/27/23 Oral (Active)                   Dressing/Packing:  Incision 01/27/23 Abdomen-Dressing/Treatment: Surgical glue (01/27/23 1100)    Splint/Cast:  ] SCHEDULED BREATHING TREATMENT ADMINISTERED IN-LINE WITH BIPAP. SKIN PROTECTIVE BARRIER IN 
PLACE. SKIN INTACT. TOLERATED TREATMENT WELL, NO ADVERSE SIDE EFFECTS. NO RESPIRATORY 
DISTRESS NOTED AT THIS TIME. WILL CONTINUE TO MONITOR.

## 2023-01-27 NOTE — PROGRESS NOTES
01/27/23 1048   Family Communication   Family Update Message Surgeon working   Delivery Origin Nurse    Relationship to Patient Spouse    Phone Number Shawn Luther [phone]   Family/Significant Other Update Called

## 2023-01-27 NOTE — ANESTHESIA POSTPROCEDURE EVALUATION
Procedure(s):  ROBOTIC UMBILICAL HERNIA REPAIR WITH MESH. general    Anesthesia Post Evaluation        Patient participation: complete - patient participated  Level of consciousness: awake  Pain management: adequate  Airway patency: patent  Anesthetic complications: no  Cardiovascular status: hemodynamically stable  Respiratory status: acceptable  Hydration status: acceptable  Comments: The patient is ready for PACU discharge. Nancy Garcia DO                   Post anesthesia nausea and vomiting:  controlled      INITIAL Post-op Vital signs:   Vitals Value Taken Time   /59 01/27/23 1215   Temp 36.7 °C (98 °F) 01/27/23 1211   Pulse 57 01/27/23 1222   Resp 19 01/27/23 1222   SpO2 100 % 01/27/23 1222   Vitals shown include unvalidated device data.

## 2023-01-27 NOTE — PERIOP NOTES
PATIENT WAS NOT ABLE TO VOID. HE SAID HE FELT HE NEEDED TO BUT COULDN'T. I BLADDER SCANNED  CC. DR. Lorri Keller WAS CALLED AND GAVE ORDER TO STRAIGHT CATH HIM. HE TOLERATED THAT WELL  CC WAS DRAINED FROM HIS BLADDER. I UPDATED HIS WIFE WHO HAD ALREADY GONE TO GET THE CAR.

## 2023-01-28 NOTE — OP NOTES
[address]   OPERATIVE REPORT    Name:  Samanta Barnett  MR#:  [id number]  :  1975  ACCOUNT #:  [de-identified]  DATE OF SERVICE:  2023    PREOPERATIVE DIAGNOSIS:  Umbilical hernia. POSTOPERATIVE DIAGNOSIS:  Incarcerated umbilical hernia. PROCEDURE PERFORMED:  Robotic incarcerated umbilical hernia repair with mesh. SURGEON:  David Johnson MD    ASSISTANT:  JUSTINE Enciso    ANESTHESIA:  General.    COMPLICATIONS:  None. SPECIMENS REMOVED:  None. IMPLANTS:  A 9 x 9 cm Bard soft mesh. ESTIMATED BLOOD LOSS:  Minimal.    DRAINS:  None. FINDINGS:  Umbilical hernia with a 2-cm defect. A large amount of incarcerated preperitoneal fat in the defect was reduced. Primarily repaired the hernia defect and used a 9 x 9 cm Bard soft mesh placed in the preperitoneal plane for repair. INDICATIONS:  The patient is a 50-year-old male who has an umbilical hernia with incarcerated fat in the defect, which is needing repair with mesh in the operating room. PROCEDURE:  The patient was met in the preoperative holding area. H and P was updated. Consent was signed. All risks and benefits were explained to the patient prior to start of the operation. He was taken back to the operating room. He was lying in a supine position. The abdomen was prepped and draped in standard sterile fashion. Time-out was called. Antibiotics were given. SCDs were on lower extremities. Started the operation by making an 8-mm incision into the left upper quadrant, inserting a da Selina VisiPort trocar into intra-abdominal cavity, insufflating to 15 mmHg. I then placed an 8-mm trocar in the left mid abdomen and another one in the left lower quadrant. They were all placed under direct visualization. There were no injuries to the underlying abdominal structures. Once docked into the abdominal cavity, we could see incarcerated preperitoneal fat in the defect.   We started our preperitoneal dissection about 5 cm away from the hernia defect, dissecting into that preperitoneal plane 5 cm above and 5 cm below the defect. We dissected into that preperitoneal plane, dissecting medially all the way to the hernia sac and dissected the preperitoneal plane above and below 5 cm as well. We reduced the preperitoneal fat in which he had a large amount of preperitoneal fat into the hernia defect which was incarcerated. We were able to reduce that surgically back into the preperitoneal space. Once that was reduced back in, we then dissected past our defect on the right side of the abdomen in that preperitoneal plane about 5 cm past the defect. We now had plenty of dissection plane and then reduced our pneumoperitoneum down to 15 mmHg. We then closed the umbilical hernia defect which measured about 2 cm with a 0 absorbable V-Loc suture. With the defect closed, we then used a piece of Bard soft mesh and measured the space that we had dissected out; it measured 9 x 9 cm in all directions. We then used a piece of Bard soft mesh, cutting it down to 9 x 9 cm, placing it into that preperitoneal plane and centering it over our hernia defect. We sutured the mesh to the anterior abdominal wall superiorly, inferiorly in the right and left lateral portions of the abdominal wall. It was sutured into place. It was in good position. Our preperitoneal defect was then closed with a 3-0 absorbable V-Loc suture in a running fashion. All of our sutures and ruler were removed at the end of the case. We were satisfied with our repair. Everything was hemostatic and all the counts were correct. We then undocked the [employer] robot, desufflated the abdominal cavity, removed the trocars, and closed the skin with 4-0 Monocryl and Dermabond to complete the operation. Dr. Jairo Stringer was present and scrubbed during the entire operation. The counts were correct.       MD CECIL Bourgeois/[id number]  D: 01/27/2023 11:31  T:  01/27/2023 20:55  JOB #:  [id number]

## 2023-02-10 ENCOUNTER — VIRTUAL VISIT (OUTPATIENT)
Dept: SURGERY | Age: 48
End: 2023-02-10
Payer: COMMERCIAL

## 2023-02-10 DIAGNOSIS — Z09 POSTOPERATIVE EXAMINATION: Primary | ICD-10-CM

## 2023-02-10 PROCEDURE — 99024 POSTOP FOLLOW-UP VISIT: CPT

## 2023-02-10 NOTE — PATIENT INSTRUCTIONS
-  No lifting greater than 15lbs x 4 weeks then may advance activity as tolerated. -  Ok to bath as normal and you may get incisions wet. Glue will fall off on its own. May moisturize incision sites.     -  May use supportive wear    -  May use heating pad for any additional Abdominal soreness    -  Please call with any questions or concerns     -  Follow up only as needed

## 2023-02-10 NOTE — PROGRESS NOTES
Identified pt with two pt identifiers (name and ). Reviewed chart in preparation for visit and have obtained necessary documentation. Loni Martin is a 53 y.o. male  Chief Complaint   Patient presents with    Post OP Follow Up     14 days s/p Robotic umbilical hernia repair w/mesh [phone]     There were no vitals taken for this visit. 1. Have you been to the ER, urgent care clinic since your last visit? Hospitalized since your last visit? No    2. Have you seen or consulted any other health care providers outside of the [address] [place] [address] since your last visit? Include any pap smears or colon screening.  No

## 2023-02-10 NOTE — PROGRESS NOTES
I was at home while conducting this encounter. Consent:  He and/or his healthcare decision maker is aware that this patient-initiated Telehealth encounter is a billable service, with coverage as determined by his insurance carrier. He is aware that he may receive a bill and has provided verbal consent to proceed: Yes    This virtual visit was conducted via D[employer]. me. Pursuant to the emergency declaration under the [address] [place] [address], [facility] authority and the [employer] and [employer] Act, this Virtual  Visit was conducted to reduce the patient's risk of exposure to COVID-19 and provide continuity of care for an established patient. Services were provided through a video synchronous discussion virtually to substitute for in-person clinic visit. Due to this being a TeleHealth evaluation, many elements of the physical examination are unable to be assessed. Total Time: minutes: 5-10 minutes. Pennie Rivera, was evaluated through a synchronous (real-time) audio-video encounter. The patient (or guardian if applicable) is aware that this is a billable service, which includes applicable co-pays. This Virtual Visit was conducted with patient's (and/or legal guardian's) consent. The visit was conducted pursuant to the emergency declaration under the [address] [place] [address], [address] [place] [address] authority and the [employer] and [employer] Act. Patient identification was verified, and a caregiver was present when appropriate. The patient was located at: Home: [address] [place] [id number]  The provider was located at: Facility ([facility] [place] [facility]): [address] [facility]  [facility] [address] [facility] [address]  [address] [facility] on 2/10/2023 at 8:19 AM    An electronic signature was used to authenticate this note.      CC: Post Operative state    Subjective:     Filipe Kinds Cristy Diallo is a 53 y.o. male presents for postop care 2 weeks s/p   Robotic incarcerated umbilical hernia repair with mesh. Patient states he believes he is doing well postoperatively. States pain is minimal and well tolerated now without any pain medication. Reports he only needed to use narcotic pain medication for the first 3 days postop and hasn't needed it since. Tolerating a regular diet; No nausea or vomiting. Bowel movements are regular. The patient is voiding without difficulty. Patient denies fever, chest pain, or shortness of breath. Review of Systems:  A comprehensive review of systems was negative except for that written above      Mr. Demetrius Wynn has a reminder for a \"due or due soon\" health maintenance. I have asked that he contact his primary care provider for follow-up on this health maintenance. Objective: There were no vitals taken for this visit. Pt appears well  Ambulating independently  Abdomen appears soft  Lap sites C/D/I without erythema or induration  Speech is clear, breathing unlabored     Assessment:       ICD-10-CM ICD-9-CM    1. Postoperative examination  Z09 V67.00           Plan:   2+ weeks s/p Robotic incarcerated umbilical hernia repair with mesh. Reassured patient that wounds were healing well. Wound care discussed. May submerge in water and continue to wash with mild soap and water. May moisturize surgical sites as desired. No lifting greater than 15 lbs x 4 weeks then may advance activity as tolerated. May use heating pad for any abdominal soreness. Jessica Yo verbalized understanding and questions were answered to the best of my knowledge and ability. Return precautions discussed. Instructed patient to call with any questions or concerns.   Discharged from surgical care with prn follow up         > 10 minutes were spent with patient with greater than 50% of that time spent face to face counseling    [address] M ED [facility]  [facility]   2/10/2023

## 2023-03-01 ENCOUNTER — TELEPHONE (OUTPATIENT)
Dept: SURGERY | Age: 48
End: 2023-03-01

## 2023-03-01 NOTE — TELEPHONE ENCOUNTER
Returned call to patient and verified using 2 patient identifiers. Pt is 4 wks 5 days s/p robotic incarcerated umbilical hernia repair with mesh. 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 He noticed some incisional pain at the  2/10 pain that started yesterday. The pain is not constant but noticeable. He denies any redness, swelling or drainage from the incision area. He does feel like he's bloated at times but having normal bowel movements. Rec: that he try advil or tylenol as needed and can use heating pad as needed. He is to call the office back if notices any fevers, redness or bulges at the incision area or the pain does not improve. Pt voiced understandings.

## 2023-03-01 NOTE — TELEPHONE ENCOUNTER
===========[id number]=================  Tue 28-Feb-23 06:16p  ======================================  Name: Patrick Avila Dr.: Ruddy Duverney        Patient: I.  Am the Pt          PtDOB: [birth year]          Phone: [phone]        Message: Hernia repaired a few  weeks ago and I need a call back  tomorrow re some pain

## 2023-05-22 RX ORDER — ATORVASTATIN CALCIUM 10 MG/1
10 TABLET, FILM COATED ORAL DAILY
COMMUNITY

## 2023-05-22 RX ORDER — LOPERAMIDE HYDROCHLORIDE 2 MG/1
2 TABLET ORAL DAILY
COMMUNITY

## 2023-05-22 RX ORDER — VALSARTAN 80 MG/1
TABLET ORAL DAILY
COMMUNITY

## 2023-05-22 RX ORDER — IBUPROFEN 800 MG/1
800 TABLET ORAL EVERY 6 HOURS PRN
COMMUNITY
Start: 2023-01-27

## 2023-05-22 RX ORDER — CALCIUM POLYCARBOPHIL 625 MG
625 TABLET ORAL 2 TIMES DAILY
COMMUNITY

## 2023-05-22 RX ORDER — LORAZEPAM 1 MG/1
TABLET ORAL EVERY 4 HOURS PRN
COMMUNITY

## 2023-05-22 RX ORDER — BUTALBITAL, ACETAMINOPHEN AND CAFFEINE 50; 325; 40 MG/1; MG/1; MG/1
1 TABLET ORAL EVERY 4 HOURS PRN
COMMUNITY

## 2023-05-22 RX ORDER — HYDROCHLOROTHIAZIDE 12.5 MG/1
TABLET ORAL
COMMUNITY
Start: 2019-03-22

## 2023-05-22 RX ORDER — ASPIRIN 81 MG/1
81 TABLET, CHEWABLE ORAL DAILY
COMMUNITY

## 2023-05-22 RX ORDER — VERAPAMIL HYDROCHLORIDE 240 MG/1
TABLET, FILM COATED, EXTENDED RELEASE ORAL
COMMUNITY
Start: 2018-05-11

## (undated) DEVICE — ARM DRAPE

## (undated) DEVICE — SUTURE VCRL SZ 3-0 L27IN ABSRB UD L26MM SH 1/2 CIR J416H

## (undated) DEVICE — DEVICE SUT FOR TRCR SITE INCIS ENDO CLOSE

## (undated) DEVICE — FILTER TRP PLUME SMK EVAC

## (undated) DEVICE — SOLUTION IRRIG 1000ML 0.9% SOD CHL USP POUR PLAS BTL

## (undated) DEVICE — DERMABOND SKIN ADH 0.7ML -- DERMABOND ADVANCED 12/BX

## (undated) DEVICE — GLOVE ORANGE PI 7 1/2   MSG9075

## (undated) DEVICE — DRAPE,SPINE,UNIVERSAL,ST,7/CS: Brand: MEDLINE

## (undated) DEVICE — SUTURE ETHBND EXCEL SZ 2-0 L36IN NONABSORBABLE GRN L26MM SH X523H

## (undated) DEVICE — COVER MPLR TIP CRV SCIS ACC DA VINCI

## (undated) DEVICE — SEAL UNIV 5-8MM DISP BX/10 -- DA VINCI XI - SNGL USE

## (undated) DEVICE — PACK,BASIC,SIRUS,V: Brand: MEDLINE

## (undated) DEVICE — GENERAL LAPAROSCOPY - SMH: Brand: MEDLINE INDUSTRIES, INC.

## (undated) DEVICE — GARMENT,MEDLINE,DVT,INT,CALF,MED, GEN2: Brand: MEDLINE

## (undated) DEVICE — HYPODERMIC SAFETY NEEDLE: Brand: MAGELLAN

## (undated) DEVICE — HANDLE LT SNAP ON ULT DURABLE LENS FOR TRUMPF ALC DISPOSABLE

## (undated) DEVICE — GLOVE SURG SZ 8 L12IN FNGR THK79MIL GRN LTX FREE

## (undated) DEVICE — SUTURE MCRYL SZ 4-0 L27IN ABSRB UD L19MM PS-2 1/2 CIR PRIM Y426H

## (undated) DEVICE — OBTRTR BLDELSS 8MM DISP -- DA VINCI - SNGL USE